# Patient Record
Sex: MALE | Race: WHITE | Employment: PART TIME | URBAN - NONMETROPOLITAN AREA
[De-identification: names, ages, dates, MRNs, and addresses within clinical notes are randomized per-mention and may not be internally consistent; named-entity substitution may affect disease eponyms.]

---

## 2017-07-20 ENCOUNTER — APPOINTMENT (OUTPATIENT)
Dept: OCCUPATIONAL MEDICINE | Facility: OTHER | Age: 22
End: 2017-07-20

## 2017-07-20 PROCEDURE — 99000 SPECIMEN HANDLING OFFICE-LAB: CPT

## 2017-07-26 ENCOUNTER — APPOINTMENT (OUTPATIENT)
Dept: OCCUPATIONAL MEDICINE | Facility: OTHER | Age: 22
End: 2017-07-26

## 2017-07-26 PROCEDURE — 90746 HEPB VACCINE 3 DOSE ADULT IM: CPT

## 2020-11-16 ENCOUNTER — HOSPITAL ENCOUNTER (INPATIENT)
Facility: HOSPITAL | Age: 25
LOS: 3 days | Discharge: HOME | End: 2020-11-19
Attending: NURSE PRACTITIONER | Admitting: PSYCHIATRY & NEUROLOGY
Payer: MEDICAID

## 2020-11-16 DIAGNOSIS — F10.929 ALCOHOL INTOXICATION (H): Primary | ICD-10-CM

## 2020-11-16 DIAGNOSIS — R45.851 SUICIDAL IDEATION: ICD-10-CM

## 2020-11-16 DIAGNOSIS — F43.10 PTSD (POST-TRAUMATIC STRESS DISORDER): ICD-10-CM

## 2020-11-16 DIAGNOSIS — F23 ACUTE PSYCHOSIS (H): ICD-10-CM

## 2020-11-16 PROBLEM — R45.89 SUICIDAL BEHAVIOR: Status: ACTIVE | Noted: 2020-11-16

## 2020-11-16 LAB
ALBUMIN SERPL-MCNC: 4.7 G/DL (ref 3.4–5)
ALBUMIN UR-MCNC: NEGATIVE MG/DL
ALP SERPL-CCNC: 86 U/L (ref 40–150)
ALT SERPL W P-5'-P-CCNC: 25 U/L (ref 0–70)
AMPHETAMINES UR QL: NOT DETECTED NG/ML
ANION GAP SERPL CALCULATED.3IONS-SCNC: 10 MMOL/L (ref 3–14)
APAP SERPL-MCNC: <2 MG/L (ref 10–20)
APPEARANCE UR: CLEAR
AST SERPL W P-5'-P-CCNC: 23 U/L (ref 0–45)
BARBITURATES UR QL SCN: NOT DETECTED NG/ML
BASOPHILS # BLD AUTO: 0.1 10E9/L (ref 0–0.2)
BASOPHILS NFR BLD AUTO: 0.5 %
BENZODIAZ UR QL SCN: NOT DETECTED NG/ML
BILIRUB SERPL-MCNC: 0.4 MG/DL (ref 0.2–1.3)
BILIRUB UR QL STRIP: NEGATIVE
BUN SERPL-MCNC: 14 MG/DL (ref 7–30)
BUPRENORPHINE UR QL: NOT DETECTED NG/ML
CALCIUM SERPL-MCNC: 8.4 MG/DL (ref 8.5–10.1)
CANNABINOIDS UR QL: ABNORMAL NG/ML
CHLORIDE SERPL-SCNC: 110 MMOL/L (ref 94–109)
CO2 SERPL-SCNC: 19 MMOL/L (ref 20–32)
COCAINE UR QL SCN: NOT DETECTED NG/ML
COLOR UR AUTO: NORMAL
CREAT SERPL-MCNC: 1 MG/DL (ref 0.66–1.25)
D-METHAMPHET UR QL: NOT DETECTED NG/ML
DIFFERENTIAL METHOD BLD: NORMAL
EOSINOPHIL # BLD AUTO: 0.1 10E9/L (ref 0–0.7)
EOSINOPHIL NFR BLD AUTO: 0.7 %
ERYTHROCYTE [DISTWIDTH] IN BLOOD BY AUTOMATED COUNT: 12.8 % (ref 10–15)
ETHANOL SERPL-MCNC: 0.34 G/DL
GFR SERPL CREATININE-BSD FRML MDRD: >90 ML/MIN/{1.73_M2}
GLUCOSE SERPL-MCNC: 103 MG/DL (ref 70–99)
GLUCOSE UR STRIP-MCNC: NEGATIVE MG/DL
HCT VFR BLD AUTO: 48.2 % (ref 40–53)
HGB BLD-MCNC: 16.9 G/DL (ref 13.3–17.7)
HGB UR QL STRIP: NEGATIVE
IMM GRANULOCYTES # BLD: 0 10E9/L (ref 0–0.4)
IMM GRANULOCYTES NFR BLD: 0.3 %
KETONES UR STRIP-MCNC: NEGATIVE MG/DL
LEUKOCYTE ESTERASE UR QL STRIP: NEGATIVE
LYMPHOCYTES # BLD AUTO: 1.5 10E9/L (ref 0.8–5.3)
LYMPHOCYTES NFR BLD AUTO: 15.9 %
MCH RBC QN AUTO: 31.7 PG (ref 26.5–33)
MCHC RBC AUTO-ENTMCNC: 35.1 G/DL (ref 31.5–36.5)
MCV RBC AUTO: 90 FL (ref 78–100)
METHADONE UR QL SCN: NOT DETECTED NG/ML
MONOCYTES # BLD AUTO: 0.4 10E9/L (ref 0–1.3)
MONOCYTES NFR BLD AUTO: 4.5 %
NEUTROPHILS # BLD AUTO: 7.1 10E9/L (ref 1.6–8.3)
NEUTROPHILS NFR BLD AUTO: 78.1 %
NITRATE UR QL: NEGATIVE
NRBC # BLD AUTO: 0 10*3/UL
NRBC BLD AUTO-RTO: 0 /100
OPIATES UR QL SCN: NOT DETECTED NG/ML
OXYCODONE UR QL SCN: NOT DETECTED NG/ML
PCP UR QL SCN: NOT DETECTED NG/ML
PH UR STRIP: 6 PH (ref 4.7–8)
PLATELET # BLD AUTO: 368 10E9/L (ref 150–450)
POTASSIUM SERPL-SCNC: 3.9 MMOL/L (ref 3.4–5.3)
PROPOXYPH UR QL: NOT DETECTED NG/ML
PROT SERPL-MCNC: 9 G/DL (ref 6.8–8.8)
RBC # BLD AUTO: 5.33 10E12/L (ref 4.4–5.9)
SALICYLATES SERPL-MCNC: 5 MG/DL
SODIUM SERPL-SCNC: 139 MMOL/L (ref 133–144)
SOURCE: NORMAL
SP GR UR STRIP: 1.01 (ref 1–1.03)
TRICYCLICS UR QL SCN: NOT DETECTED NG/ML
TSH SERPL DL<=0.005 MIU/L-ACNC: 1.84 MU/L (ref 0.4–4)
UROBILINOGEN UR STRIP-MCNC: NORMAL MG/DL (ref 0–2)
WBC # BLD AUTO: 9.1 10E9/L (ref 4–11)

## 2020-11-16 PROCEDURE — 80329 ANALGESICS NON-OPIOID 1 OR 2: CPT | Performed by: NURSE PRACTITIONER

## 2020-11-16 PROCEDURE — 36415 COLL VENOUS BLD VENIPUNCTURE: CPT | Performed by: NURSE PRACTITIONER

## 2020-11-16 PROCEDURE — 124N000001 HC R&B MH

## 2020-11-16 PROCEDURE — 99284 EMERGENCY DEPT VISIT MOD MDM: CPT | Performed by: NURSE PRACTITIONER

## 2020-11-16 PROCEDURE — 99285 EMERGENCY DEPT VISIT HI MDM: CPT

## 2020-11-16 PROCEDURE — 80053 COMPREHEN METABOLIC PANEL: CPT | Performed by: NURSE PRACTITIONER

## 2020-11-16 PROCEDURE — U0003 INFECTIOUS AGENT DETECTION BY NUCLEIC ACID (DNA OR RNA); SEVERE ACUTE RESPIRATORY SYNDROME CORONAVIRUS 2 (SARS-COV-2) (CORONAVIRUS DISEASE [COVID-19]), AMPLIFIED PROBE TECHNIQUE, MAKING USE OF HIGH THROUGHPUT TECHNOLOGIES AS DESCRIBED BY CMS-2020-01-R: HCPCS | Performed by: NURSE PRACTITIONER

## 2020-11-16 PROCEDURE — 81003 URINALYSIS AUTO W/O SCOPE: CPT | Performed by: NURSE PRACTITIONER

## 2020-11-16 PROCEDURE — 80306 DRUG TEST PRSMV INSTRMNT: CPT | Performed by: NURSE PRACTITIONER

## 2020-11-16 PROCEDURE — 80320 DRUG SCREEN QUANTALCOHOLS: CPT | Performed by: NURSE PRACTITIONER

## 2020-11-16 PROCEDURE — 85025 COMPLETE CBC W/AUTO DIFF WBC: CPT | Performed by: NURSE PRACTITIONER

## 2020-11-16 PROCEDURE — C9803 HOPD COVID-19 SPEC COLLECT: HCPCS

## 2020-11-16 PROCEDURE — 84443 ASSAY THYROID STIM HORMONE: CPT | Performed by: NURSE PRACTITIONER

## 2020-11-16 RX ORDER — AMOXICILLIN 250 MG
1 CAPSULE ORAL 2 TIMES DAILY PRN
Status: DISCONTINUED | OUTPATIENT
Start: 2020-11-16 | End: 2020-11-19 | Stop reason: HOSPADM

## 2020-11-16 RX ORDER — HYDROXYZINE HYDROCHLORIDE 25 MG/1
25-50 TABLET, FILM COATED ORAL EVERY 4 HOURS PRN
Status: DISCONTINUED | OUTPATIENT
Start: 2020-11-16 | End: 2020-11-19 | Stop reason: HOSPADM

## 2020-11-16 RX ORDER — ACETAMINOPHEN 325 MG/1
650 TABLET ORAL EVERY 4 HOURS PRN
Status: DISCONTINUED | OUTPATIENT
Start: 2020-11-16 | End: 2020-11-19 | Stop reason: HOSPADM

## 2020-11-16 RX ORDER — OLANZAPINE 5 MG/1
5-10 TABLET ORAL 3 TIMES DAILY PRN
Status: DISCONTINUED | OUTPATIENT
Start: 2020-11-16 | End: 2020-11-19 | Stop reason: HOSPADM

## 2020-11-16 RX ORDER — LANOLIN ALCOHOL/MO/W.PET/CERES
3 CREAM (GRAM) TOPICAL
Status: DISCONTINUED | OUTPATIENT
Start: 2020-11-16 | End: 2020-11-19 | Stop reason: HOSPADM

## 2020-11-16 RX ORDER — OLANZAPINE 10 MG/2ML
5-10 INJECTION, POWDER, FOR SOLUTION INTRAMUSCULAR 3 TIMES DAILY PRN
Status: DISCONTINUED | OUTPATIENT
Start: 2020-11-16 | End: 2020-11-19 | Stop reason: HOSPADM

## 2020-11-16 RX ORDER — DIAZEPAM 5 MG
10 TABLET ORAL EVERY 30 MIN PRN
Status: DISCONTINUED | OUTPATIENT
Start: 2020-11-16 | End: 2020-11-18

## 2020-11-16 RX ORDER — MAGNESIUM HYDROXIDE/ALUMINUM HYDROXICE/SIMETHICONE 120; 1200; 1200 MG/30ML; MG/30ML; MG/30ML
30 SUSPENSION ORAL EVERY 4 HOURS PRN
Status: DISCONTINUED | OUTPATIENT
Start: 2020-11-16 | End: 2020-11-19 | Stop reason: HOSPADM

## 2020-11-16 RX ORDER — MULTIPLE VITAMINS W/ MINERALS TAB 9MG-400MCG
1 TAB ORAL DAILY
Status: DISCONTINUED | OUTPATIENT
Start: 2020-11-17 | End: 2020-11-19 | Stop reason: HOSPADM

## 2020-11-16 RX ORDER — FOLIC ACID 1 MG/1
1 TABLET ORAL DAILY
Status: DISCONTINUED | OUTPATIENT
Start: 2020-11-17 | End: 2020-11-19 | Stop reason: HOSPADM

## 2020-11-16 ASSESSMENT — ENCOUNTER SYMPTOMS
COUGH: 0
WOUND: 0
CHILLS: 0
NAUSEA: 0
FEVER: 0
DYSPHORIC MOOD: 0
ABDOMINAL PAIN: 0
VOMITING: 0
HEADACHES: 0
LIGHT-HEADEDNESS: 0
DIFFICULTY URINATING: 0
DIZZINESS: 0
SHORTNESS OF BREATH: 0

## 2020-11-16 ASSESSMENT — MIFFLIN-ST. JEOR: SCORE: 1639.43

## 2020-11-17 LAB
SARS-COV-2 RNA SPEC QL NAA+PROBE: NORMAL
SPECIMEN SOURCE: NORMAL

## 2020-11-17 PROCEDURE — 250N000013 HC RX MED GY IP 250 OP 250 PS 637: Performed by: NURSE PRACTITIONER

## 2020-11-17 PROCEDURE — 124N000001 HC R&B MH

## 2020-11-17 PROCEDURE — 99223 1ST HOSP IP/OBS HIGH 75: CPT | Performed by: NURSE PRACTITIONER

## 2020-11-17 RX ORDER — PRAZOSIN HYDROCHLORIDE 1 MG/1
1 CAPSULE ORAL
Status: DISCONTINUED | OUTPATIENT
Start: 2020-11-17 | End: 2020-11-17

## 2020-11-17 RX ORDER — NICOTINE 21 MG/24HR
1 PATCH, TRANSDERMAL 24 HOURS TRANSDERMAL DAILY
Status: DISCONTINUED | OUTPATIENT
Start: 2020-11-17 | End: 2020-11-19 | Stop reason: HOSPADM

## 2020-11-17 RX ORDER — MIRTAZAPINE 7.5 MG/1
7.5 TABLET, FILM COATED ORAL
Status: DISCONTINUED | OUTPATIENT
Start: 2020-11-17 | End: 2020-11-17

## 2020-11-17 RX ORDER — PRAZOSIN HYDROCHLORIDE 1 MG/1
1 CAPSULE ORAL AT BEDTIME
Status: DISCONTINUED | OUTPATIENT
Start: 2020-11-17 | End: 2020-11-19 | Stop reason: HOSPADM

## 2020-11-17 RX ORDER — MIRTAZAPINE 7.5 MG/1
7.5 TABLET, FILM COATED ORAL AT BEDTIME
Status: DISCONTINUED | OUTPATIENT
Start: 2020-11-17 | End: 2020-11-19 | Stop reason: HOSPADM

## 2020-11-17 RX ADMIN — PRAZOSIN HYDROCHLORIDE 1 MG: 1 CAPSULE ORAL at 22:31

## 2020-11-17 RX ADMIN — NICOTINE 1 PATCH: 21 PATCH, EXTENDED RELEASE TRANSDERMAL at 13:37

## 2020-11-17 RX ADMIN — MIRTAZAPINE 7.5 MG: 7.5 TABLET, FILM COATED ORAL at 22:31

## 2020-11-17 RX ADMIN — NICOTINE POLACRILEX 2 MG: 2 GUM, CHEWING ORAL at 11:36

## 2020-11-17 RX ADMIN — FOLIC ACID 1 MG: 1 TABLET ORAL at 08:14

## 2020-11-17 RX ADMIN — MULTIPLE VITAMINS W/ MINERALS TAB 1 TABLET: TAB at 08:14

## 2020-11-17 RX ADMIN — Medication 100 MG: at 08:14

## 2020-11-17 ASSESSMENT — ACTIVITIES OF DAILY LIVING (ADL)
DRESS: SCRUBS (BEHAVIORAL HEALTH);INDEPENDENT
ORAL_HYGIENE: INDEPENDENT
LAUNDRY: UNABLE TO COMPLETE
HYGIENE/GROOMING: SHOWER;INDEPENDENT
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
## 2020-11-17 NOTE — PLAN OF CARE
"Face to face shift report received from Paradise COTTON RN.       Problem: Behavioral Health Plan of Care  Goal: Patient-Specific Goal (Individualization)  Outcome: Improving   Pt moved to open unit this shift. Pt calm and cooperative. Requested to receive HS medications when going to bed. Pt still in lounge at 2215. Requested and received HS medications at 2230. Denies thoughts of harming self or others. Reports some anxiety after speaking with mother on phone, however \"I am dealing with it internally.\" Spends majority of shift in lounge area. Social and appropriate with peers. Attends groups. No reports of pain.     Problem: Alcohol Withdrawal  Goal: Alcohol Withdrawal Symptom Control  Outcome: Improving   CIWA: 1 and 1    Problem: Suicide Risk  Goal: Absence of Self-Harm  Outcome: Improving   Free from self harm or injury this shift.     Face to face end of shift report to be communicated to onczaynab RN.     "
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
## 2020-11-17 NOTE — PLAN OF CARE
"Face to face received from Toya HAMILTON RN.   Rounding completed, pt observed in bed at this time appears to be sleeping.     Pt cooperative with morning medications. Pt denies SI, HI, VH, and AH this shift. Pt denies pain this morning, but states feels \"hangover\".   Pt stating as to \" never should have day drinked\" and doesn't remember much of yesterday or even coming in.   Pt denies ever having suicidal thoughts.   NP in to assess pt, and pt reports concerns with sleep lately.   Remeron and mini press scheduled for pt for bedtime.   Pt weaning onto open unit this shift, and remains approperiate with staff and peers.     Pt scores 0 on CIWA this shift, no symptoms of withdrawal this shift.     Problem: Adult Inpatient Plan of Care  Goal: Patient-Specific Goal (Individualized)  Outcome: Improving  Flowsheets (Taken 11/17/2020 1131)  Individualized Care Needs: \"None\"     Problem: Behavioral Health Plan of Care  Goal: Patient-Specific Goal (Individualization)  Outcome: Improving  Flowsheets (Taken 11/17/2020 1131)  Individualized Care Needs: \"None\"  Note:        Problem: Alcohol Withdrawal  Goal: Alcohol Withdrawal Symptom Control  Outcome: Improving     Problem: Acute Neurologic Deterioration (Alcohol Withdrawal)  Goal: Optimal Neurologic Function  Outcome: Improving     Problem: Acute Neurologic Deterioration (Alcohol Withdrawal)  Goal: Optimal Neurologic Function  Outcome: Improving     Problem: Substance Misuse (Alcohol Withdrawal)  Goal: Readiness for Change Identified  Outcome: Improving     Problem: Suicide Risk  Goal: Absence of Self-Harm  Outcome: Improving     Problem: Suicidal Behavior  Goal: Suicidal Behavior is Absent or Managed  Outcome: Improving    Face to face end of shift report to be  communicated to oncoming RN.     Paradise Caldwell RN  11/17/2020  3:20 PM          "

## 2020-11-17 NOTE — ED NOTES
Charge Nurse on  made aware of admission. States she is going to move beds around and will call back when room is ready
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
## 2020-11-17 NOTE — PROGRESS NOTES
11/16/20 0651   Patient Belongings   Did you bring any home meds/supplements to the hospital?  No   Patient Belongings remains with patient;locker;sent to security per site process   Patient Belongings Remaining with Patient other (see comments);body jewelry  (Tongue ring (piercing))   Patient Belongings Put in Hospital Secure Location (Security or Locker, etc.) ring;clothing;shoes;other (see comments)   Belongings Search Yes   Clothing Search Yes   Second Staff Foster   Comment Nike shoes, black gloves, tamara brunner jacket, grey tshirt, green sweatshirt, which socks, red shorts, grey sweatpants, 2 lighters, 26 cents (1 quarter, 1 thais)   List items sent to safe: 2 silver-colored rings (triangle design and 1 line design)    All other belongings put in assigned cubby in belongings room.     I have reviewed my belongings list on admission and verify that it is correct.     Patient signature_______________________________    Second staff witness (if patient unable to sign) ______________________________       I have received all my belongings at discharge.    Patient signature________________________________    Waldemar  11/16/2020  11:56 PM

## 2020-11-17 NOTE — PLAN OF CARE
"  Problem: Alcohol Withdrawal  Goal: Alcohol Withdrawal Symptom Control  Outcome: No Change     Problem: Acute Neurologic Deterioration (Alcohol Withdrawal)  Goal: Optimal Neurologic Function  Outcome: No Change     Problem: Substance Misuse (Alcohol Withdrawal)  Goal: Readiness for Change Identified  Outcome: No Change     Problem: Suicide Risk  Goal: Absence of Self-Harm  Outcome: No Change     Problem: Behavioral Health Plan of Care  Goal: Patient-Specific Goal (Individualization)  Outcome: Improving  Note: Shift Summery:      0001 Patient in bed with eyes closed and regular resps.    0600 Patient remains in bed with eyes closed and regular resps. Did not score on the CIWAH scale this shift related to sleeping throughout the night for a total of 7 hours this shift.    Face to face end of shift report communicated to 7-3 shift RN.     Toya Alvarado RN  11/17/2020  6:16 AM        Patient's Stated Goal for Shift:  \"no stated goal\"    Goal Status:  In Process       "
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
## 2020-11-17 NOTE — ED NOTES
Pt ambulated to BR w/ continuous watch attendant at side. Gave UA sample. Pleasant and cooperative.  Offered fluids and warm blanket.

## 2020-11-18 LAB
LABORATORY COMMENT REPORT: NORMAL
SARS-COV-2 RNA SPEC QL NAA+PROBE: NEGATIVE
SPECIMEN SOURCE: NORMAL

## 2020-11-18 PROCEDURE — 250N000013 HC RX MED GY IP 250 OP 250 PS 637: Performed by: NURSE PRACTITIONER

## 2020-11-18 PROCEDURE — 99233 SBSQ HOSP IP/OBS HIGH 50: CPT | Performed by: NURSE PRACTITIONER

## 2020-11-18 PROCEDURE — 124N000001 HC R&B MH

## 2020-11-18 RX ADMIN — PRAZOSIN HYDROCHLORIDE 1 MG: 1 CAPSULE ORAL at 22:15

## 2020-11-18 RX ADMIN — FOLIC ACID 1 MG: 1 TABLET ORAL at 08:29

## 2020-11-18 RX ADMIN — NICOTINE 1 PATCH: 21 PATCH, EXTENDED RELEASE TRANSDERMAL at 08:29

## 2020-11-18 RX ADMIN — Medication 100 MG: at 08:29

## 2020-11-18 RX ADMIN — MIRTAZAPINE 7.5 MG: 7.5 TABLET, FILM COATED ORAL at 22:15

## 2020-11-18 RX ADMIN — MULTIPLE VITAMINS W/ MINERALS TAB 1 TABLET: TAB at 08:29

## 2020-11-18 ASSESSMENT — ACTIVITIES OF DAILY LIVING (ADL)
HYGIENE/GROOMING: INDEPENDENT
DRESS: SCRUBS (BEHAVIORAL HEALTH)
LAUNDRY: UNABLE TO COMPLETE
ORAL_HYGIENE: INDEPENDENT

## 2020-11-18 NOTE — PLAN OF CARE
"  Problem: Adult Inpatient Plan of Care  Goal: Plan of Care Review  Recent Flowsheet Documentation  Taken 11/18/2020 0808 by Anand Vo RN  Plan of Care Reviewed With: patient     Problem: Adult Inpatient Plan of Care  Goal: Optimal Comfort and Wellbeing  Intervention: Provide Person-Centered Care  Recent Flowsheet Documentation  Taken 11/18/2020 0808 by Anand Vo RN  Trust Relationship/Rapport:   care explained   questions encouraged   questions answered   reassurance provided   thoughts/feelings acknowledged     Problem: Behavioral Health Plan of Care  Goal: Plan of Care Review  Recent Flowsheet Documentation  Taken 11/18/2020 0808 by Anand Vo RN  Plan of Care Reviewed With: patient  Patient Agreement with Plan of Care: agrees     Problem: Behavioral Health Plan of Care  Goal: Patient-Specific Goal (Individualization)  Description: Patient will participate in at least 50% of groups.   Patient will eat at least 50% of meals.  Patient will follow recommendations of treatment team.  Outcome: Improving  He is awake in his room this morning. He denies having any suicidal thoughts. He has poor to no recollection of the events leading up to his admission. He was intoxicated. He is not showing signs of ETOH withdrawal. He denies feeling suicidal. He  talks of drinking with his mothers boyfriend starting at noon and states \"we drank a half gallon of whiskey way too fast\", \"I don't usually drink like that\".  He is compliant with medications. He is maintaining appropriate boundaries with staff and peers.     Problem: Behavioral Health Plan of Care  Goal: Develops/Participates in Therapeutic Butler to Support Successful Transition  Intervention: Foster Therapeutic Butler  Recent Flowsheet Documentation  Taken 11/18/2020 0808 by Anand Vo RN  Trust Relationship/Rapport:   care explained   questions encouraged   questions answered   reassurance provided   thoughts/feelings acknowledged     Problem: Alcohol " "Withdrawal  Goal: Alcohol Withdrawal Symptom Control  Outcome: Improving  He is not having any symptoms of alcohol withdrawal.     Problem: Suicide Risk  Goal: Absence of Self-Harm  Outcome: Improving  He denies having any suicidal thinking. He talks \"its funny how I ended up here now when I feel like my life is going well\".       "
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
## 2020-11-18 NOTE — PLAN OF CARE
Problem: Behavioral Health Plan of Care  Goal: Patient-Specific Goal (Individualization)  Description: Patient will participate in at least 50% of groups.   Patient will eat at least 50% of meals.  Patient will follow recommendations of treatment team.      Outcome: No Change  Note:        Problem: Alcohol Withdrawal  Goal: Alcohol Withdrawal Symptom Control  Outcome: No Change     Problem: Suicide Risk  Goal: Absence of Self-Harm  Outcome: No Change     Problem: Fall Injury Risk  Goal: Absence of Fall and Fall-Related Injury  Description: Pt will wear appropriate footwear.  Pt will remain free from falls.   Outcome: Improving     Face to face shift report received from Jennifer GREENFIELD. Rounding completed, pt observed.     Pt appeared to be sleeping most of this shift. Pt did not have any noted episodes of self harm or falls this shift.    Face to face report will be communicated to onczaynab GREENFIELD.    Janae Harrell RN  11/18/2020  5:57 AM

## 2020-11-18 NOTE — PLAN OF CARE
D: Girma was seen this afternoon in his room. He presented as O x 4,coherent,relevant,talkative and cooperative. He denied any threat of harm to himself and/or others. He was not derailed in thought. His insight and judgement appeared to be within a range acceptable for safety. He recounted multiple events which has brought him to the hospital. Girma admitted to substance use disorders including that of opioid,meth, and alcohol. He has tried to stop using on several occasions but have relapsed. The pt also has a number issues that have not been dealt with in a manner that is suitable to him including his father's death, the  of a baby he and the baby's mother had planned for and his relationship with his mother.    A: R/O Major Depressive D.O., R/O Alcohol Use Disorder    P: Encourage Pt to follow up with O/P Therapy.

## 2020-11-19 VITALS
DIASTOLIC BLOOD PRESSURE: 85 MMHG | RESPIRATION RATE: 16 BRPM | SYSTOLIC BLOOD PRESSURE: 128 MMHG | OXYGEN SATURATION: 98 % | WEIGHT: 151 LBS | TEMPERATURE: 96.9 F | BODY MASS INDEX: 22.88 KG/M2 | HEIGHT: 68 IN | HEART RATE: 70 BPM

## 2020-11-19 PROCEDURE — 99238 HOSP IP/OBS DSCHRG MGMT 30/<: CPT | Performed by: NURSE PRACTITIONER

## 2020-11-19 PROCEDURE — 250N000013 HC RX MED GY IP 250 OP 250 PS 637: Performed by: NURSE PRACTITIONER

## 2020-11-19 RX ORDER — MIRTAZAPINE 7.5 MG/1
7.5 TABLET, FILM COATED ORAL AT BEDTIME
Qty: 30 TABLET | Refills: 0 | Status: SHIPPED | OUTPATIENT
Start: 2020-11-19

## 2020-11-19 RX ORDER — MIRTAZAPINE 7.5 MG/1
7.5 TABLET, FILM COATED ORAL AT BEDTIME
Qty: 30 TABLET | Refills: 0 | Status: SHIPPED | OUTPATIENT
Start: 2020-11-19 | End: 2020-11-19

## 2020-11-19 RX ORDER — PRAZOSIN HYDROCHLORIDE 1 MG/1
1 CAPSULE ORAL AT BEDTIME
Qty: 30 CAPSULE | Refills: 0 | Status: SHIPPED | OUTPATIENT
Start: 2020-11-19 | End: 2020-11-19

## 2020-11-19 RX ORDER — PRAZOSIN HYDROCHLORIDE 1 MG/1
1 CAPSULE ORAL AT BEDTIME
Qty: 30 CAPSULE | Refills: 0 | Status: SHIPPED | OUTPATIENT
Start: 2020-11-19

## 2020-11-19 RX ADMIN — Medication 100 MG: at 08:22

## 2020-11-19 RX ADMIN — FOLIC ACID 1 MG: 1 TABLET ORAL at 08:22

## 2020-11-19 RX ADMIN — NICOTINE 1 PATCH: 21 PATCH, EXTENDED RELEASE TRANSDERMAL at 08:22

## 2020-11-19 RX ADMIN — MULTIPLE VITAMINS W/ MINERALS TAB 1 TABLET: TAB at 08:22

## 2020-11-19 NOTE — PLAN OF CARE
Discharge Note    Patient Discharged to home on 11/19/2020 12:17 PM via Transportation Concerns accompanied by staff to  medications at [facility] then he will walk home.     Patient informed of discharge instructions in AVS. patient verbalizes understanding and denies having any questions pertaining to AVS. Patient stable at time of discharge. Patient denies SI, HI, and thoughts of self harm at time of discharge. All personal belongings returned to patient. Discharge prescriptions sent to [facility] via electronic communication. Psych evaluation, history and physical, AVS, and discharge summary available to next level of care.     Anand Vo RN  11/19/2020  12:17 PM

## 2020-11-19 NOTE — PLAN OF CARE
Problem: Behavioral Health Plan of Care  Goal: Patient-Specific Goal (Individualization)  Description: Patient will participate in at least 50% of groups.   Patient will eat at least 50% of meals.  Patient will follow recommendations of treatment team.  Outcome: Improving  He is up on the unit for breakfast. He is compliant with his medications. He is maintaining appropriate boundaries with staff and peers. He is anticipating discharging home today. He states that he is feeling ready. He denies any suicidal thoughts. He continues with depression but feels that he will continue to improve.     Problem: Suicide Risk  Goal: Absence of Self-Harm  Outcome: Improving  He denies having any suicidal thoughts     Problem: Fall Injury Risk  Goal: Absence of Fall and Fall-Related Injury  Description: Pt will wear appropriate footwear.  Pt will remain free from falls.   Outcome: Improving  He is steady on his feet.

## 2020-11-19 NOTE — PLAN OF CARE
Problem: Alcohol Withdrawal  Goal: Alcohol Withdrawal Symptom Control  Outcome: Improving   Patient has no signs or symptoms of withdrawal at this time.          Problem: Suicidal Behavior  Goal: Suicidal Behavior is Absent or Managed  Outcome: Improving       Patient denies thoughts of suicide at this time.        Problem: Behavioral Health Plan of Care  Goal: Patient-Specific Goal (Individualization)  Description: Patient will participate in at least 50% of groups.   Patient will eat at least 50% of meals.  Patient will follow recommendations of treatment team.      Outcome: Improving  Note:      Patient has been calm, cooperative, and medications compliant this shift.  He was in the lounge all evening and social with peers and staff.  Did not attend groups.  Denies thoughts of suicide and states he will be ready to discharge home tomorrow.  No complaints of pain.  VS WNL.  Face to face end of shift report communicated to night shift RN.     Pebbles Alarcon RN  11/18/2020  9:38 PM
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
## 2020-11-19 NOTE — PLAN OF CARE
Problem: Behavioral Health Plan of Care  Goal: Patient-Specific Goal (Individualization)  Description: Patient will participate in at least 50% of groups.   Patient will eat at least 50% of meals.  Patient will follow recommendations of treatment team.      Outcome: No Change  Note:        Problem: Alcohol Withdrawal  Goal: Alcohol Withdrawal Symptom Control  Outcome: No Change     Problem: Suicide Risk  Goal: Absence of Self-Harm  Outcome: No Change     Problem: Fall Injury Risk  Goal: Absence of Fall and Fall-Related Injury  Description: Pt will wear appropriate footwear.  Pt will remain free from falls.   Outcome: Improving     Face to face shift report received from Pebbles GREENFIELD. Rounding completed, pt observed.    Pt appeared to only sleep a total of 3.5 hours on and off this shift. Pt did not have any noted episodes of self harm or falls this shift.    Face to face report will be communicated to oncoming JEAN PIERRE.    Janae Harrell RN  11/19/2020  6:11 AM

## 2020-11-19 NOTE — DISCHARGE INSTRUCTIONS
Behavioral Discharge Planning and Instructions    Summary:   Girma Morales is a 24 year old male who presents ambulatory accompanied by [place] [employer] intoxicated for psychiatric evaluation. According to police his mother called the  after he threatened to harm himself.    Main Diagnosis: Suicidal behavior     Major Treatments, Procedures and Findings: Stabilize with medications, connect with community programs.    Symptoms to Report: feeling more aggressive, increased confusion, losing more sleep, mood getting worse or thoughts of suicide    Lifestyle Adjustment: Take all medications as prescribed, meet with doctor/ medication provider, out patient therapist, , and [facility] worker as scheduled. Abstain from alcohol or any unprescribed drugs.    Psychiatry Follow-up:     [address] [address]  Appointment: Aby Castañeda MD, 12/03/2020 @ 8:45 am Establishing Care and Post Hospital follow up.   Phone:  [phone]      NUMBERS TO CALL IN CRISIS    National Suicide Prevention Lifeline ([facility])  1-868.123.2838    Crisis Text Line ([place])  Text  HOME  to 653031    Mental Health Crisis Line ([place], [place])  535.670.1832    [facility] Mobile Crisis ([place], [place])   [phone]      If you are feeling very unsafe, call 911 or bring yourself to the Emergency Room        Counseling in [place]:  Janes Orozco           [phone]  Lyn[facility]ay [facility]    Kelsi Neal [facility]      [phone]  [facility]     Jolanta Hahn [facility] (young kids)    [phone]   Paradise Angel [facility] (older kids & adults)  [phone]   MERRY Dawn      [phone]  [facility]       [phone]   Ankush Garza MS, LP   Peggy Schmidt MA, LPC   Nivia Waite MS psychotherapist   Marilu Sommers MS, LPC   MERRY Fabian, counselor   MERRY Szymanski, counselor  [place]        [phone]  Zenon Perry,  counseling  Dr Kasia Barnes, psychiatry  Meghan Garrett, counseling  Freddy Kim, counseling  Lawrence Moreno, counseling  Josiane Dumont, psychiatry   [facility]       [phone]   Ammy Rivera MA, LMFT, [facility]   Lou Knox MSW, [facility]          [phone]  [facility]      [phone]   Sarahi Nairbertha MS, [facility]          [phone]        Mark Jordan MSW, [facility] , C-MI   Maia Dc MSW, [facility]                                                    Oscar Pospeck [facility]      Vi Campos MS, [facility]                [phone]      Hanane Sullivan PsyD     Marilyn Vann PsyD, owner      Frances Vazquez PsyD    Rosanna Alberts MPAS, SHERLYN Zapata PhD   Mervat Wheeler MSW, LICSW [facility]       [phone]   Cheryl Basilio [facility]   Kranthi Fields APRN, CNP,     Trinity Kent MSW, [facility] (adolescents)   Jackie Grinnel APRN, CNP (Hib via telehealth; adolescents)   Yasmine IVY, CNP (Hib via telehealth)   Clarence Cedeño MA, LPC, BCIA (Hib via telehealth)   Dorie Jones [facility]   Isa Perryjoel MSW, [facility]   Marta Saha [facility]   Jimmy Mckeon DNP, APRN, CNP   Meghan Coombs RN, BSN   Jaida Mccall RN-BC, BSN (Hib via telehealth)  [facility]         [phone]   Natali Dye, MSN, PMHNP-[facility]           [phone]   Foster Lenz MA, LMFT   Pat Naranjo MS RN [facility] NP   Sarahi Mcnair, [facility]         [phone]  Carlos Eduardo [facility]       [phone]   [facility]   Vivienne Mcmullen (to be [facility])   Moises Sumner (to be [facility])      Counseling in [place]:  [facility]       [phone]   mental health & CD counseling  Clarence Vernon       [phone]  [facility]       [phone]  [facility]        [phone]   Ammy AMANDA       [facility]               [phone]   can do weekends and evenings   DeLorr Niall, MSW, LICSW, LCSW, CCTP    Heber Crane MA, LMFT, LICSW  [place] Z[facility]y Underwood       evenings, weekends  [phone]      Counseling in [place]:  [facility]         [phone]   Natali Dye, MSN, PMP-BC   Slime Mcdonnell MA, [facility]      [phone]  [facility]       [phone]   Sharmila Neal PMFT  [facility]       Shannon Simpson       [phone]  [facility]    [address]     [facility]      [phone]  [facility]     Freddy Hurst MS Ed, [facility]    [phone]    (kids) denotes providers who also see kids    Resources:   Crisis Intervention: 286.430.7390 or 893-768-1989 (TTY: 281.564.6192).  Call anytime for help.  National [place] on Mental Illness (www.[place].alonzo.org): 696.895.1610 or 518-318-1064.  Alcoholics Anonymous (www.alcoholics-anonymous.org): Check your phone book for your local chapter.  Suicide Awareness Voices of Education (SAVE) (www.save.org): 366-504-FGGM (0830)  National Suicide Prevention Line (www.mentalhealthmn.org): 647-587-MZGX (5531)  Mental Health Consumer/Survivor Network of [place] (www.mhcsn.net): 303.838.3445 or 617-422-8925  Mental Health Association of [place] (www.mentalhealth.org): 197.184.7247 or 388-203-1383    General Medication Instructions:   See your medication sheet(s) for instructions.   Take all medicines as directed.  Make no changes unless your doctor suggests them.   Go to all your doctor visits.  Be sure to have all your required lab tests. This way, your medicines can be refilled on time.  Do not use any drugs not prescribed by your doctor.  Avoid alcohol.    [place] Area:  [facility], [facility] stabilization [facility]- [phone]  [facility] Crisis Line: 1-857.668.7051  [facility] Family [facility]: 015-7319  Sexual Assault Program [facility] [place]: 079-088-7157 or 1-583.924.2195  [place]ie  "[place] [facility] Women[facility]: 7-368-313-1516 Ext: 279       Calls answered Mon-Fri-8:00 am--4:30 pm    [place]:  [facility] Family Peace: 9-562-983-4935  [facility] - 8-425-740-2159  [facility] first call for help: 2-006-153-7419  [facility]:  (727) 569-8226    [place] Area:  Warm Line: 1-631.565.9726       Calls answered Tuesday--Saturday 4:00 pm--10:00 pm  [place] Crisis Line - [phone]  [place] [facility] 502-696-8676    [place] Statewide:  [place] Crisis and Referral Services: 8-827-250-4152  National Suicide Prevention Lifeline: 3-737-180-TALK (0061)   - yxd8echm- Text \"Life\" to 35612  First Call for Help: 2-1-1  NOLVIA Helpline- 5-111-LADW-HELP   Crisis Text Line: Text  [place]  to 502962  "

## 2021-01-05 PROBLEM — F23 ACUTE PSYCHOSIS (H): Status: RESOLVED | Noted: 2020-11-16 | Resolved: 2021-01-05

## 2023-09-22 NOTE — PLAN OF CARE
Pt is discharging at the recommendation of the treatment team. Pt is discharging to Home, patient wants to walk and refused to have transportation setup, he stated he lives a block away from hospital. Pt denies having any thoughts of hurting themself or anyone else. Pt denies anxiety or depression. Pt has follow up with Aby Castañeda MD for post hospital follow up and establishing care. Discharge instructions, including; demographic sheet, psychiatric evaluation, discharge summary, and AVS were faxed to these next level of care providers.   Localized Dermabrasion With Wire Brush Text: The patient was draped in routine manner.  Localized dermabrasion using 3 x 17 mm wire brush was performed in routine manner to papillary dermis. This spot dermabrasion is being performed to complete skin cancer reconstruction. It also will eliminate the other sun damaged precancerous cells that are known to be part of the regional effect of a lifetime's worth of sun exposure. This localized dermabrasion is therapeutic and should not be considered cosmetic in any regard. Localized Dermabrasion Text: The patient was draped in routine manner.  Localized dermabrasion using 3 x 17 mm wire brush was performed in routine manner to papillary dermis. This spot dermabrasion is being performed to complete skin cancer reconstruction. It also will eliminate the other sun damaged precancerous cells that are known to be part of the regional effect of a lifetime's worth of sun exposure. This localized dermabrasion is therapeutic and should not be considered cosmetic in any regard.